# Patient Record
Sex: FEMALE | URBAN - METROPOLITAN AREA
[De-identification: names, ages, dates, MRNs, and addresses within clinical notes are randomized per-mention and may not be internally consistent; named-entity substitution may affect disease eponyms.]

---

## 2024-03-01 PROBLEM — 84089009: Status: ACTIVE | Noted: 2024-03-01

## 2024-03-05 ENCOUNTER — OV HOSPF/U (OUTPATIENT)
Dept: URBAN - METROPOLITAN AREA CLINIC 66 | Facility: CLINIC | Age: 84
End: 2024-03-05
Payer: COMMERCIAL

## 2024-03-05 ENCOUNTER — LAB (OUTPATIENT)
Dept: URBAN - METROPOLITAN AREA CLINIC 66 | Facility: CLINIC | Age: 84
End: 2024-03-05

## 2024-03-05 VITALS — HEIGHT: 61 IN | BODY MASS INDEX: 23.22 KG/M2 | WEIGHT: 123 LBS

## 2024-03-05 DIAGNOSIS — D50.9 IRON DEFICIENCY ANEMIA, UNSPECIFIED IRON DEFICIENCY ANEMIA TYPE: ICD-10-CM

## 2024-03-05 DIAGNOSIS — K44.9 HIATAL HERNIA: ICD-10-CM

## 2024-03-05 DIAGNOSIS — Z12.11 COLON CANCER SCREENING: ICD-10-CM

## 2024-03-05 DIAGNOSIS — K64.4 EXTERNAL HEMORRHOID: ICD-10-CM

## 2024-03-05 DIAGNOSIS — K57.30 DIVERTICULOSIS OF COLON: ICD-10-CM

## 2024-03-05 PROBLEM — 23913003: Status: ACTIVE | Noted: 2024-03-05

## 2024-03-05 PROCEDURE — 99204 OFFICE O/P NEW MOD 45 MIN: CPT | Performed by: INTERNAL MEDICINE

## 2024-03-05 RX ORDER — HYDROCORTISONE ACETATE 30 MG/1
1 SUPPOSITORY SUPPOSITORY RECTAL ONCE A DAY
Qty: 90 SUPPOSITORY | Refills: 1 | OUTPATIENT
Start: 2024-03-05 | End: 2024-09-01

## 2024-03-05 RX ORDER — LABETALOL HYDROCHLORIDE 100 MG/1
1 TABLET TABLET, FILM COATED ORAL TWICE A DAY
Status: ACTIVE | COMMUNITY

## 2024-03-05 RX ORDER — UBROGEPANT 100 MG/1
1 TABLET MAY TAKE SECOND DOSE AT LEAST 2 HOURS AFTER FIRST DOSE AS NEEDED TABLET ORAL ONCE A DAY
Status: ACTIVE | COMMUNITY

## 2024-03-05 RX ORDER — PREGABALIN 50 MG/1
1 CAPSULE CAPSULE ORAL TWICE A DAY
Status: ACTIVE | COMMUNITY

## 2024-03-05 RX ORDER — LASMIDITAN 100 MG/1
1 TABLET AS NEEDED TABLET ORAL ONCE A DAY
Status: ACTIVE | COMMUNITY

## 2024-03-05 NOTE — HPI-TODAY'S VISIT:
Patient evaluated due to iron pdef anemia. Referred had recent admission at [facility]. REferred anemia is chronic and referred multiple hospital admission. Referred is pending hematologic evaluation later this month.  REferred having intermittent external hermorroids discomfort and need refill for her topical treatment.  Denies nausea, vomits, dysphagia, odynophagia, heartburn, abdominal pain, diarrhea, constipation GI bleeding or weight loss

## 2024-05-06 ENCOUNTER — TELEPHONE ENCOUNTER (OUTPATIENT)
Dept: URBAN - METROPOLITAN AREA CLINIC 68 | Facility: CLINIC | Age: 84
End: 2024-05-06

## 2024-05-07 ENCOUNTER — OFFICE VISIT (OUTPATIENT)
Dept: URBAN - METROPOLITAN AREA CLINIC 66 | Facility: CLINIC | Age: 84
End: 2024-05-07

## 2024-12-31 ENCOUNTER — NEW PATIENT (OUTPATIENT)
Age: 84
End: 2024-12-31

## 2024-12-31 DIAGNOSIS — Z96.1: ICD-10-CM

## 2024-12-31 DIAGNOSIS — H35.033: ICD-10-CM

## 2024-12-31 DIAGNOSIS — H16.223: ICD-10-CM

## 2024-12-31 DIAGNOSIS — H35.3131: ICD-10-CM

## 2024-12-31 DIAGNOSIS — H26.493: ICD-10-CM

## 2024-12-31 PROCEDURE — 99204 OFFICE O/P NEW MOD 45 MIN: CPT

## 2024-12-31 PROCEDURE — 92015 DETERMINE REFRACTIVE STATE: CPT

## 2025-08-07 ENCOUNTER — TELEPHONE ENCOUNTER (OUTPATIENT)
Dept: URBAN - METROPOLITAN AREA CLINIC 68 | Facility: CLINIC | Age: 85
End: 2025-08-07